# Patient Record
Sex: FEMALE | Race: WHITE | Employment: OTHER | URBAN - METROPOLITAN AREA
[De-identification: names, ages, dates, MRNs, and addresses within clinical notes are randomized per-mention and may not be internally consistent; named-entity substitution may affect disease eponyms.]

---

## 2023-12-20 RX ORDER — TRAMADOL HYDROCHLORIDE 50 MG/1
50 TABLET ORAL EVERY 6 HOURS PRN
Status: CANCELLED | OUTPATIENT
Start: 2023-12-20

## 2023-12-20 NOTE — DISCHARGE INSTRUCTIONS
The patient will call for an appointment in the next 2 weeks for follow-up.    The patient has been instructed on wound care and may shower if wound is clean and dry.  Perform dry dressing change daily.  Do NOT remove Prineo mesh adhesive overlying surgical incision.  If the wound has drainage then dry dressing changes should be performed daily until the wound is dry.  The patient has been instructed to contact the office if increasing drainage, redness, or swelling to the operative wound/extremity occurs.  Similarly, if they develop fevers and chills they should call the office ASAP.    The patient will continue to wear DARIEL hose to both legs for 3 weeks.  They may remove them at night or if they are causing skin irritation.  Prescribed DVT prophylaxis should be taken for 2-3 weeks after discharge (as written on prescription).  Oral pain medications have been provided and instruction on administration given to the patient.  If there are any questions or increasing or uncontrolled pain, the patient should call the office [phone].    The patient will resume a normal diet.  They should anticipate a slight decrease in appetite after surgery, but should notify the office if there are any problems with nausea, vomiting, constipation or diarrhea.  A stool softner has been ordered and should be taken regularly while on pain medications.    Sometimes managing your health at home requires assistance.  The [facility] team was notified that your surgeon has preoperatively arranged for you to use [facility].  A representative from the home health agency will contact you or your family to schedule your first visit.      [facility]  [address] [address]  Phone: [phone]  Fax: [phone]

## 2023-12-28 RX ORDER — SERTRALINE HYDROCHLORIDE 25 MG/1
50 TABLET, FILM COATED ORAL DAILY
COMMUNITY
Start: 2023-12-08

## 2023-12-28 RX ORDER — BRIVARACETAM 100 MG/1
1 TABLET, FILM COATED ORAL 2 TIMES DAILY
COMMUNITY
Start: 2022-10-31

## 2023-12-28 RX ORDER — INSULIN GLARGINE 100 [IU]/ML
28 INJECTION, SOLUTION SUBCUTANEOUS EVERY MORNING
COMMUNITY
Start: 2023-12-14

## 2023-12-28 RX ORDER — ZOLPIDEM TARTRATE 10 MG/1
10 TABLET ORAL NIGHTLY PRN
COMMUNITY

## 2023-12-28 RX ORDER — IBANDRONATE SODIUM 150 MG/1
150 TABLET, FILM COATED ORAL
COMMUNITY

## 2023-12-28 RX ORDER — INSULIN LISPRO 100 [IU]/ML
10 INJECTION, SOLUTION INTRAVENOUS; SUBCUTANEOUS
COMMUNITY
Start: 2023-10-16

## 2023-12-28 RX ORDER — MINOXIDIL 2.5 MG/1
2.5 TABLET ORAL DAILY
COMMUNITY
Start: 2022-11-15

## 2023-12-28 RX ORDER — LOSARTAN POTASSIUM 25 MG/1
1 TABLET ORAL DAILY
COMMUNITY
Start: 2023-07-06

## 2023-12-28 RX ORDER — GLUCAGON INJECTION, SOLUTION 1 MG/.2ML
1 INJECTION, SOLUTION SUBCUTANEOUS ONCE AS NEEDED
COMMUNITY
Start: 2022-05-31

## 2023-12-28 NOTE — CM/SW NOTE
DSC received email (Umm) from Dr. No's office indicating pt is scheduled for an upcoming surgery.      DC notes indicate pt's discharge plan should be home with HHC     Pre Op Plan for DC: Home with HHC - agency pending    DSC will send HHC referrals, will need HHC orders/F2F prior to DC  Saba Mayorga DSC

## 2024-01-03 ENCOUNTER — APPOINTMENT (OUTPATIENT)
Dept: GENERAL RADIOLOGY | Facility: HOSPITAL | Age: 74
End: 2024-01-03
Attending: NURSE PRACTITIONER
Payer: MEDICARE

## 2024-01-03 ENCOUNTER — ANESTHESIA (OUTPATIENT)
Dept: SURGERY | Facility: HOSPITAL | Age: 74
End: 2024-01-03
Payer: MEDICARE

## 2024-01-03 ENCOUNTER — HOSPITAL ENCOUNTER (OUTPATIENT)
Facility: HOSPITAL | Age: 74
Discharge: HOME HEALTH | End: 2024-01-05
Attending: ORTHOPAEDIC SURGERY | Admitting: ORTHOPAEDIC SURGERY
Payer: MEDICARE

## 2024-01-03 ENCOUNTER — ANESTHESIA EVENT (OUTPATIENT)
Dept: SURGERY | Facility: HOSPITAL | Age: 74
End: 2024-01-03
Payer: MEDICARE

## 2024-01-03 PROBLEM — I10 ESSENTIAL HYPERTENSION: Status: ACTIVE | Noted: 2024-01-03

## 2024-01-03 PROBLEM — G40.909 SEIZURE DISORDER (HCC): Status: ACTIVE | Noted: 2024-01-03

## 2024-01-03 PROBLEM — M16.12 PRIMARY OSTEOARTHRITIS OF LEFT HIP: Status: ACTIVE | Noted: 2024-01-03

## 2024-01-03 PROBLEM — E10.9 DIABETES MELLITUS TYPE 1 (HCC): Status: ACTIVE | Noted: 2024-01-03

## 2024-01-03 PROBLEM — N18.30 CKD (CHRONIC KIDNEY DISEASE) STAGE 3, GFR 30-59 ML/MIN (HCC): Status: ACTIVE | Noted: 2024-01-03

## 2024-01-03 LAB
ANION GAP SERPL CALC-SCNC: 4 MMOL/L (ref 0–18)
ANTIBODY SCREEN: NEGATIVE
BUN BLD-MCNC: 22 MG/DL (ref 9–23)
BUN/CREAT SERPL: 19 (ref 10–20)
CALCIUM BLD-MCNC: 8.5 MG/DL (ref 8.7–10.4)
CHLORIDE SERPL-SCNC: 111 MMOL/L (ref 98–112)
CO2 SERPL-SCNC: 24 MMOL/L (ref 21–32)
CREAT BLD-MCNC: 1.16 MG/DL
EGFRCR SERPLBLD CKD-EPI 2021: 50 ML/MIN/1.73M2 (ref 60–?)
EST. AVERAGE GLUCOSE BLD GHB EST-MCNC: 143 MG/DL (ref 68–126)
GLUCOSE BLD-MCNC: 284 MG/DL (ref 70–99)
GLUCOSE BLDC GLUCOMTR-MCNC: 231 MG/DL (ref 70–99)
GLUCOSE BLDC GLUCOMTR-MCNC: 306 MG/DL (ref 70–99)
GLUCOSE BLDC GLUCOMTR-MCNC: 306 MG/DL (ref 70–99)
GLUCOSE BLDC GLUCOMTR-MCNC: 314 MG/DL (ref 70–99)
GLUCOSE BLDC GLUCOMTR-MCNC: 337 MG/DL (ref 70–99)
GLUCOSE BLDC GLUCOMTR-MCNC: 369 MG/DL (ref 70–99)
GLUCOSE BLDC GLUCOMTR-MCNC: 377 MG/DL (ref 70–99)
GLUCOSE BLDC GLUCOMTR-MCNC: 390 MG/DL (ref 70–99)
HBA1C MFR BLD: 6.6 % (ref ?–5.7)
OSMOLALITY SERPL CALC.SUM OF ELEC: 302 MOSM/KG (ref 275–295)
POTASSIUM SERPL-SCNC: 4.1 MMOL/L (ref 3.5–5.1)
RH BLOOD TYPE: POSITIVE
RH BLOOD TYPE: POSITIVE
SODIUM SERPL-SCNC: 139 MMOL/L (ref 136–145)

## 2024-01-03 PROCEDURE — 0SRB0JZ REPLACEMENT OF LEFT HIP JOINT WITH SYNTHETIC SUBSTITUTE, OPEN APPROACH: ICD-10-PCS | Performed by: ORTHOPAEDIC SURGERY

## 2024-01-03 PROCEDURE — 99205 OFFICE O/P NEW HI 60 MIN: CPT | Performed by: HOSPITALIST

## 2024-01-03 PROCEDURE — 72170 X-RAY EXAM OF PELVIS: CPT | Performed by: NURSE PRACTITIONER

## 2024-01-03 DEVICE — IMPLANTABLE DEVICE
Type: IMPLANTABLE DEVICE | Site: HIP | Status: FUNCTIONAL
Brand: G7® ACETABULAR SYSTEM

## 2024-01-03 DEVICE — SCREW BNE L30MM DIA6.5MM TIV ST COUNTERSUNK: Type: IMPLANTABLE DEVICE | Site: HIP | Status: FUNCTIONAL

## 2024-01-03 DEVICE — IMPLANTABLE DEVICE
Type: IMPLANTABLE DEVICE | Site: HIP | Status: FUNCTIONAL
Brand: G7® VIVACIT-E®

## 2024-01-03 DEVICE — IMPLANTABLE DEVICE
Type: IMPLANTABLE DEVICE | Site: HIP | Status: FUNCTIONAL
Brand: WAGNER CONE PROSTHESIS®

## 2024-01-03 DEVICE — IMPLANTABLE DEVICE: Type: IMPLANTABLE DEVICE | Site: HIP | Status: FUNCTIONAL

## 2024-01-03 DEVICE — BIOLOX® DELTA, CERAMIC FEMORAL HEAD, M, Ø 36/0, TAPER 12/14
Type: IMPLANTABLE DEVICE | Site: HIP | Status: FUNCTIONAL
Brand: BIOLOX® DELTA

## 2024-01-03 RX ORDER — NEOSTIGMINE METHYLSULFATE 1 MG/ML
INJECTION, SOLUTION INTRAVENOUS AS NEEDED
Status: DISCONTINUED | OUTPATIENT
Start: 2024-01-03 | End: 2024-01-03 | Stop reason: SURG

## 2024-01-03 RX ORDER — DEXTROSE MONOHYDRATE 25 G/50ML
50 INJECTION, SOLUTION INTRAVENOUS
Status: DISCONTINUED | OUTPATIENT
Start: 2024-01-03 | End: 2024-01-05

## 2024-01-03 RX ORDER — INSULIN ASPART 100 [IU]/ML
15 INJECTION, SOLUTION INTRAVENOUS; SUBCUTANEOUS ONCE
Status: COMPLETED | OUTPATIENT
Start: 2024-01-03 | End: 2024-01-03

## 2024-01-03 RX ORDER — MIDAZOLAM HYDROCHLORIDE 1 MG/ML
INJECTION INTRAMUSCULAR; INTRAVENOUS AS NEEDED
Status: DISCONTINUED | OUTPATIENT
Start: 2024-01-03 | End: 2024-01-03 | Stop reason: SURG

## 2024-01-03 RX ORDER — EPHEDRINE SULFATE 50 MG/ML
INJECTION INTRAVENOUS AS NEEDED
Status: DISCONTINUED | OUTPATIENT
Start: 2024-01-03 | End: 2024-01-03 | Stop reason: SURG

## 2024-01-03 RX ORDER — BISACODYL 10 MG
10 SUPPOSITORY, RECTAL RECTAL
Status: DISCONTINUED | OUTPATIENT
Start: 2024-01-03 | End: 2024-01-05

## 2024-01-03 RX ORDER — NICOTINE POLACRILEX 4 MG
30 LOZENGE BUCCAL
Status: DISCONTINUED | OUTPATIENT
Start: 2024-01-03 | End: 2024-01-05

## 2024-01-03 RX ORDER — DIPHENHYDRAMINE HYDROCHLORIDE 50 MG/ML
25 INJECTION INTRAMUSCULAR; INTRAVENOUS ONCE AS NEEDED
Status: ACTIVE | OUTPATIENT
Start: 2024-01-03 | End: 2024-01-03

## 2024-01-03 RX ORDER — MORPHINE SULFATE 4 MG/ML
4 INJECTION, SOLUTION INTRAMUSCULAR; INTRAVENOUS EVERY 10 MIN PRN
Status: DISCONTINUED | OUTPATIENT
Start: 2024-01-03 | End: 2024-01-03 | Stop reason: HOSPADM

## 2024-01-03 RX ORDER — LIDOCAINE HYDROCHLORIDE 10 MG/ML
INJECTION, SOLUTION EPIDURAL; INFILTRATION; INTRACAUDAL; PERINEURAL AS NEEDED
Status: DISCONTINUED | OUTPATIENT
Start: 2024-01-03 | End: 2024-01-03 | Stop reason: SURG

## 2024-01-03 RX ORDER — NALOXONE HYDROCHLORIDE 0.4 MG/ML
80 INJECTION, SOLUTION INTRAMUSCULAR; INTRAVENOUS; SUBCUTANEOUS AS NEEDED
Status: DISCONTINUED | OUTPATIENT
Start: 2024-01-03 | End: 2024-01-03 | Stop reason: HOSPADM

## 2024-01-03 RX ORDER — DIPHENHYDRAMINE HYDROCHLORIDE 50 MG/ML
12.5 INJECTION INTRAMUSCULAR; INTRAVENOUS EVERY 4 HOURS PRN
Status: DISCONTINUED | OUTPATIENT
Start: 2024-01-03 | End: 2024-01-05

## 2024-01-03 RX ORDER — DOCUSATE SODIUM 100 MG/1
100 CAPSULE, LIQUID FILLED ORAL 2 TIMES DAILY
Status: DISCONTINUED | OUTPATIENT
Start: 2024-01-03 | End: 2024-01-05

## 2024-01-03 RX ORDER — HYDROCODONE BITARTRATE AND ACETAMINOPHEN 5; 325 MG/1; MG/1
1 TABLET ORAL EVERY 4 HOURS PRN
Status: DISCONTINUED | OUTPATIENT
Start: 2024-01-03 | End: 2024-01-05

## 2024-01-03 RX ORDER — DIPHENHYDRAMINE HCL 25 MG
25 CAPSULE ORAL EVERY 4 HOURS PRN
Status: DISCONTINUED | OUTPATIENT
Start: 2024-01-03 | End: 2024-01-05

## 2024-01-03 RX ORDER — KETOROLAC TROMETHAMINE 15 MG/ML
15 INJECTION, SOLUTION INTRAMUSCULAR; INTRAVENOUS ONCE
Status: COMPLETED | OUTPATIENT
Start: 2024-01-03 | End: 2024-01-03

## 2024-01-03 RX ORDER — CEFAZOLIN SODIUM/WATER 2 G/20 ML
2 SYRINGE (ML) INTRAVENOUS ONCE
Status: COMPLETED | OUTPATIENT
Start: 2024-01-03 | End: 2024-01-03

## 2024-01-03 RX ORDER — DEXTROSE MONOHYDRATE 25 G/50ML
50 INJECTION, SOLUTION INTRAVENOUS
Status: DISCONTINUED | OUTPATIENT
Start: 2024-01-03 | End: 2024-01-03 | Stop reason: HOSPADM

## 2024-01-03 RX ORDER — FAMOTIDINE 20 MG/1
20 TABLET, FILM COATED ORAL 2 TIMES DAILY
Status: DISCONTINUED | OUTPATIENT
Start: 2024-01-03 | End: 2024-01-05

## 2024-01-03 RX ORDER — ROCURONIUM BROMIDE 10 MG/ML
INJECTION, SOLUTION INTRAVENOUS AS NEEDED
Status: DISCONTINUED | OUTPATIENT
Start: 2024-01-03 | End: 2024-01-03 | Stop reason: SURG

## 2024-01-03 RX ORDER — DEXAMETHASONE SODIUM PHOSPHATE 4 MG/ML
VIAL (ML) INJECTION AS NEEDED
Status: DISCONTINUED | OUTPATIENT
Start: 2024-01-03 | End: 2024-01-03 | Stop reason: SURG

## 2024-01-03 RX ORDER — ONDANSETRON 2 MG/ML
4 INJECTION INTRAMUSCULAR; INTRAVENOUS EVERY 6 HOURS PRN
Status: DISCONTINUED | OUTPATIENT
Start: 2024-01-03 | End: 2024-01-03 | Stop reason: HOSPADM

## 2024-01-03 RX ORDER — SODIUM CHLORIDE, SODIUM LACTATE, POTASSIUM CHLORIDE, CALCIUM CHLORIDE 600; 310; 30; 20 MG/100ML; MG/100ML; MG/100ML; MG/100ML
INJECTION, SOLUTION INTRAVENOUS CONTINUOUS
Status: DISCONTINUED | OUTPATIENT
Start: 2024-01-03 | End: 2024-01-05

## 2024-01-03 RX ORDER — HYDROMORPHONE HYDROCHLORIDE 1 MG/ML
0.4 INJECTION, SOLUTION INTRAMUSCULAR; INTRAVENOUS; SUBCUTANEOUS EVERY 5 MIN PRN
Status: DISCONTINUED | OUTPATIENT
Start: 2024-01-03 | End: 2024-01-03 | Stop reason: HOSPADM

## 2024-01-03 RX ORDER — HYDROMORPHONE HYDROCHLORIDE 1 MG/ML
0.6 INJECTION, SOLUTION INTRAMUSCULAR; INTRAVENOUS; SUBCUTANEOUS EVERY 5 MIN PRN
Status: DISCONTINUED | OUTPATIENT
Start: 2024-01-03 | End: 2024-01-03 | Stop reason: HOSPADM

## 2024-01-03 RX ORDER — CYCLOBENZAPRINE HCL 10 MG
10 TABLET ORAL EVERY 8 HOURS PRN
Status: DISCONTINUED | OUTPATIENT
Start: 2024-01-03 | End: 2024-01-05

## 2024-01-03 RX ORDER — HYDROCODONE BITARTRATE AND ACETAMINOPHEN 10; 325 MG/1; MG/1
1 TABLET ORAL EVERY 4 HOURS PRN
Status: DISCONTINUED | OUTPATIENT
Start: 2024-01-03 | End: 2024-01-05

## 2024-01-03 RX ORDER — METOCLOPRAMIDE HYDROCHLORIDE 5 MG/ML
10 INJECTION INTRAMUSCULAR; INTRAVENOUS EVERY 8 HOURS PRN
Status: DISCONTINUED | OUTPATIENT
Start: 2024-01-03 | End: 2024-01-05

## 2024-01-03 RX ORDER — ONDANSETRON 2 MG/ML
INJECTION INTRAMUSCULAR; INTRAVENOUS AS NEEDED
Status: DISCONTINUED | OUTPATIENT
Start: 2024-01-03 | End: 2024-01-03 | Stop reason: SURG

## 2024-01-03 RX ORDER — HYDROMORPHONE HYDROCHLORIDE 1 MG/ML
0.5 INJECTION, SOLUTION INTRAMUSCULAR; INTRAVENOUS; SUBCUTANEOUS EVERY 4 HOURS PRN
Status: DISCONTINUED | OUTPATIENT
Start: 2024-01-03 | End: 2024-01-05

## 2024-01-03 RX ORDER — MORPHINE SULFATE 10 MG/ML
6 INJECTION, SOLUTION INTRAMUSCULAR; INTRAVENOUS EVERY 10 MIN PRN
Status: DISCONTINUED | OUTPATIENT
Start: 2024-01-03 | End: 2024-01-03 | Stop reason: HOSPADM

## 2024-01-03 RX ORDER — ONDANSETRON 2 MG/ML
4 INJECTION INTRAMUSCULAR; INTRAVENOUS EVERY 6 HOURS PRN
Status: DISCONTINUED | OUTPATIENT
Start: 2024-01-03 | End: 2024-01-05

## 2024-01-03 RX ORDER — ZOLPIDEM TARTRATE 5 MG/1
10 TABLET ORAL NIGHTLY PRN
Status: DISCONTINUED | OUTPATIENT
Start: 2024-01-03 | End: 2024-01-05

## 2024-01-03 RX ORDER — CEFAZOLIN SODIUM/WATER 2 G/20 ML
2 SYRINGE (ML) INTRAVENOUS EVERY 8 HOURS
Qty: 40 ML | Refills: 0 | Status: COMPLETED | OUTPATIENT
Start: 2024-01-03 | End: 2024-01-04

## 2024-01-03 RX ORDER — HYDROCODONE BITARTRATE AND ACETAMINOPHEN 10; 325 MG/1; MG/1
2 TABLET ORAL EVERY 4 HOURS PRN
Status: DISCONTINUED | OUTPATIENT
Start: 2024-01-03 | End: 2024-01-05

## 2024-01-03 RX ORDER — ENEMA 19; 7 G/133ML; G/133ML
1 ENEMA RECTAL ONCE AS NEEDED
Status: DISCONTINUED | OUTPATIENT
Start: 2024-01-03 | End: 2024-01-05

## 2024-01-03 RX ORDER — FAMOTIDINE 10 MG/ML
20 INJECTION, SOLUTION INTRAVENOUS 2 TIMES DAILY
Status: DISCONTINUED | OUTPATIENT
Start: 2024-01-03 | End: 2024-01-05

## 2024-01-03 RX ORDER — ACETAMINOPHEN 500 MG
1000 TABLET ORAL ONCE
Status: COMPLETED | OUTPATIENT
Start: 2024-01-03 | End: 2024-01-03

## 2024-01-03 RX ORDER — BUPIVACAINE HYDROCHLORIDE 2.5 MG/ML
INJECTION, SOLUTION EPIDURAL; INFILTRATION; INTRACAUDAL AS NEEDED
Status: DISCONTINUED | OUTPATIENT
Start: 2024-01-03 | End: 2024-01-03 | Stop reason: HOSPADM

## 2024-01-03 RX ORDER — NICOTINE POLACRILEX 4 MG
30 LOZENGE BUCCAL
Status: DISCONTINUED | OUTPATIENT
Start: 2024-01-03 | End: 2024-01-03 | Stop reason: HOSPADM

## 2024-01-03 RX ORDER — NICOTINE POLACRILEX 4 MG
15 LOZENGE BUCCAL
Status: DISCONTINUED | OUTPATIENT
Start: 2024-01-03 | End: 2024-01-05

## 2024-01-03 RX ORDER — ASPIRIN 81 MG/1
81 TABLET ORAL 2 TIMES DAILY
Status: DISCONTINUED | OUTPATIENT
Start: 2024-01-03 | End: 2024-01-05

## 2024-01-03 RX ORDER — LOSARTAN POTASSIUM 25 MG/1
25 TABLET ORAL DAILY
Status: DISCONTINUED | OUTPATIENT
Start: 2024-01-04 | End: 2024-01-05

## 2024-01-03 RX ORDER — HYDROMORPHONE HYDROCHLORIDE 1 MG/ML
0.2 INJECTION, SOLUTION INTRAMUSCULAR; INTRAVENOUS; SUBCUTANEOUS EVERY 5 MIN PRN
Status: DISCONTINUED | OUTPATIENT
Start: 2024-01-03 | End: 2024-01-03 | Stop reason: HOSPADM

## 2024-01-03 RX ORDER — SENNOSIDES 8.6 MG
17.2 TABLET ORAL NIGHTLY
Status: DISCONTINUED | OUTPATIENT
Start: 2024-01-03 | End: 2024-01-05

## 2024-01-03 RX ORDER — TRANEXAMIC ACID 650 MG/1
1300 TABLET ORAL ONCE
Status: COMPLETED | OUTPATIENT
Start: 2024-01-03 | End: 2024-01-03

## 2024-01-03 RX ORDER — MINOXIDIL 2.5 MG/1
1.25 TABLET ORAL DAILY
Status: DISCONTINUED | OUTPATIENT
Start: 2024-01-04 | End: 2024-01-05

## 2024-01-03 RX ORDER — DIPHENHYDRAMINE HYDROCHLORIDE 50 MG/ML
INJECTION INTRAMUSCULAR; INTRAVENOUS AS NEEDED
Status: DISCONTINUED | OUTPATIENT
Start: 2024-01-03 | End: 2024-01-03 | Stop reason: SURG

## 2024-01-03 RX ORDER — SODIUM CHLORIDE, SODIUM LACTATE, POTASSIUM CHLORIDE, CALCIUM CHLORIDE 600; 310; 30; 20 MG/100ML; MG/100ML; MG/100ML; MG/100ML
INJECTION, SOLUTION INTRAVENOUS CONTINUOUS
Status: DISCONTINUED | OUTPATIENT
Start: 2024-01-03 | End: 2024-01-03 | Stop reason: HOSPADM

## 2024-01-03 RX ORDER — METOCLOPRAMIDE HYDROCHLORIDE 5 MG/ML
10 INJECTION INTRAMUSCULAR; INTRAVENOUS EVERY 8 HOURS PRN
Status: DISCONTINUED | OUTPATIENT
Start: 2024-01-03 | End: 2024-01-03 | Stop reason: HOSPADM

## 2024-01-03 RX ORDER — ACETAMINOPHEN 500 MG
1000 TABLET ORAL EVERY 8 HOURS PRN
Status: DISCONTINUED | OUTPATIENT
Start: 2024-01-03 | End: 2024-01-05

## 2024-01-03 RX ORDER — NICOTINE POLACRILEX 4 MG
15 LOZENGE BUCCAL
Status: DISCONTINUED | OUTPATIENT
Start: 2024-01-03 | End: 2024-01-03 | Stop reason: HOSPADM

## 2024-01-03 RX ORDER — MORPHINE SULFATE 4 MG/ML
2 INJECTION, SOLUTION INTRAMUSCULAR; INTRAVENOUS EVERY 10 MIN PRN
Status: DISCONTINUED | OUTPATIENT
Start: 2024-01-03 | End: 2024-01-03 | Stop reason: HOSPADM

## 2024-01-03 RX ORDER — POLYETHYLENE GLYCOL 3350 17 G/17G
17 POWDER, FOR SOLUTION ORAL DAILY PRN
Status: DISCONTINUED | OUTPATIENT
Start: 2024-01-03 | End: 2024-01-05

## 2024-01-03 RX ADMIN — EPHEDRINE SULFATE 5 MG: 50 INJECTION INTRAVENOUS at 11:14:00

## 2024-01-03 RX ADMIN — LIDOCAINE HYDROCHLORIDE 50 MG: 10 INJECTION, SOLUTION EPIDURAL; INFILTRATION; INTRACAUDAL; PERINEURAL at 09:52:00

## 2024-01-03 RX ADMIN — MIDAZOLAM HYDROCHLORIDE 1 MG: 1 INJECTION INTRAMUSCULAR; INTRAVENOUS at 09:43:00

## 2024-01-03 RX ADMIN — ROCURONIUM BROMIDE 40 MG: 10 INJECTION, SOLUTION INTRAVENOUS at 09:53:00

## 2024-01-03 RX ADMIN — DIPHENHYDRAMINE HYDROCHLORIDE 25 MG: 50 INJECTION INTRAMUSCULAR; INTRAVENOUS at 10:01:00

## 2024-01-03 RX ADMIN — SODIUM CHLORIDE, SODIUM LACTATE, POTASSIUM CHLORIDE, CALCIUM CHLORIDE: 600; 310; 30; 20 INJECTION, SOLUTION INTRAVENOUS at 11:37:00

## 2024-01-03 RX ADMIN — CEFAZOLIN SODIUM/WATER 2 G: 2 G/20 ML SYRINGE (ML) INTRAVENOUS at 10:01:00

## 2024-01-03 RX ADMIN — SODIUM CHLORIDE, SODIUM LACTATE, POTASSIUM CHLORIDE, CALCIUM CHLORIDE: 600; 310; 30; 20 INJECTION, SOLUTION INTRAVENOUS at 09:39:00

## 2024-01-03 RX ADMIN — EPHEDRINE SULFATE 5 MG: 50 INJECTION INTRAVENOUS at 11:09:00

## 2024-01-03 RX ADMIN — ONDANSETRON 4 MG: 2 INJECTION INTRAMUSCULAR; INTRAVENOUS at 11:28:00

## 2024-01-03 RX ADMIN — SODIUM CHLORIDE, SODIUM LACTATE, POTASSIUM CHLORIDE, CALCIUM CHLORIDE: 600; 310; 30; 20 INJECTION, SOLUTION INTRAVENOUS at 10:51:00

## 2024-01-03 RX ADMIN — EPHEDRINE SULFATE 5 MG: 50 INJECTION INTRAVENOUS at 10:52:00

## 2024-01-03 RX ADMIN — DEXAMETHASONE SODIUM PHOSPHATE 4 MG: 4 MG/ML VIAL (ML) INJECTION at 10:01:00

## 2024-01-03 RX ADMIN — MIDAZOLAM HYDROCHLORIDE 1 MG: 1 INJECTION INTRAMUSCULAR; INTRAVENOUS at 09:46:00

## 2024-01-03 RX ADMIN — EPHEDRINE SULFATE 5 MG: 50 INJECTION INTRAVENOUS at 11:21:00

## 2024-01-03 RX ADMIN — SODIUM CHLORIDE, SODIUM LACTATE, POTASSIUM CHLORIDE, CALCIUM CHLORIDE: 600; 310; 30; 20 INJECTION, SOLUTION INTRAVENOUS at 11:11:00

## 2024-01-03 RX ADMIN — NEOSTIGMINE METHYLSULFATE 3 MG: 1 INJECTION, SOLUTION INTRAVENOUS at 11:32:00

## 2024-01-03 NOTE — CM/SW NOTE
Department  notified of request for HHC, aidin referrals started. Assigned CM/SW to follow up with pt/family on further discharge planning.     Saba Mayorga, DSC

## 2024-01-03 NOTE — ANESTHESIA PREPROCEDURE EVALUATION
Anesthesia PreOp Note    HPI:     Nikia Urrutia is a 73 year old female who presents for preoperative consultation requested by: Williams No MD    Date of Surgery: 1/3/2024    Procedure(s):  Left total hip arthroplasty  Indication: Left hip degenerative joint disease    Relevant Problems   No relevant active problems       NPO:  Last Liquid Consumption Date: 24  Last Liquid Consumption Time: 2200  Last Solid Consumption Date: 24  Last Solid Consumption Time: 1900  Last Liquid Consumption Date: 24          History Review:  There are no problems to display for this patient.      Past Medical History:   Diagnosis Date    Back problem     Cancer (HCC)     right breast    Cancer (HCC)     basal skin on face    Depression     Exposure to medical diagnostic radiation     Hearing impairment     [employer] hearing aides    High blood pressure     Hx of motion sickness     Osteoarthritis     Personal history of antineoplastic chemotherapy     Seizure disorder (HCC)     Type 1 diabetes mellitus (HCC)     Visual impairment     contacts       Past Surgical History:   Procedure Laterality Date          x2    CHOLECYSTECTOMY      FRACTURE SURGERY Left     left hand and middle finger    LUMPECTOMY RIGHT      SPINE SURGERY PROCEDURE UNLISTED Right     S1 fusion    SPINE SURGERY PROCEDURE UNLISTED      L4- S1 fusion       Medications Prior to Admission   Medication Sig Dispense Refill Last Dose    Brivaracetam (BRIVIACT) 100 MG Oral Tab Take 1 tablet by mouth 2 (two) times daily.   1/3/2024 at 0530    Ibandronate Sodium 150 MG Oral Tab Take 1 tablet (150 mg total) by mouth every 30 (thirty) days.   2023    insulin glargine (LANTUS SOLOSTAR) 100 UNIT/ML Subcutaneous Solution Pen-injector Inject 28 Units into the skin every morning.   1/3/2024 at 0530    Insulin Lispro, 1 Unit Dial, (HUMALOG KWIKPEN) 100 UNIT/ML Subcutaneous Solution Pen-injector Inject 10 Units into the skin 3 (three) times daily  before meals.   2024 at 1830    losartan 25 MG Oral Tab Take 1 tablet (25 mg total) by mouth daily.   2024 at 0800    minoxidil 2.5 MG Oral Tab Take 1 tablet (2.5 mg total) by mouth daily. Take 1/2 tab daily   2024 at 0800    Multiple Vitamin (MULTI-VITAMIN) Oral Tab Take 1 tablet by mouth daily.   2023    sertraline 25 MG Oral Tab Take 2 tablets (50 mg total) by mouth daily.   1/3/2024 at 0530    zolpidem 10 MG Oral Tab Take 1 tablet (10 mg total) by mouth nightly as needed.   2024 at 2100    glucagon (GVOKE HYPOPEN 2-PACK) 1 MG/0.2ML Subcutaneous SUBQ injection Inject 0.2 mL (1 mg total) into the skin once as needed for Low blood glucose.   More than a month     Current Facility-Administered Medications Ordered in Epic   Medication Dose Route Frequency Provider Last Rate Last Admin    ceFAZolin (Ancef) 2 g in 20mL IV syringe premix  2 g Intravenous Once Sammi Rutherford NP        lactated ringers infusion   Intravenous Continuous NoWilliams de la cruz MD 20 mL/hr at 24 0814 New Bag at 24 0814     No current [facility]-ordered outpatient medications on file.       Allergies   Allergen Reactions    Penicillins TONGUE SWELLING    Sulfa Antibiotics TONGUE SWELLING       Family History   Problem Relation Age of Onset    Cancer Mother         ovarian and breast     Social History     Socioeconomic History    Marital status:    Tobacco Use    Smoking status: Former     Types: Cigarettes     Quit date: 1975     Years since quittin.0    Smokeless tobacco: Never   Vaping Use    Vaping Use: Never used   Substance and Sexual Activity    Alcohol use: Yes     Comment: social-  5 drinks per week    Drug use: Never       Available pre-op labs reviewed.     Lab Results   Component Value Date    PGLU 369 (H) 2024          Vital Signs:  Body mass index is 23.3 kg/m².   height is 1.651 m (5' 5\") and weight is 63.5 kg (140 lb). Her oral temperature is 98.4 °F (36.9 °C). Her blood pressure is  131/67 and her pulse is 87. Her respiration is 18 and oxygen saturation is 98%.   Vitals:    12/28/23 0831 01/03/24 0750   BP:  131/67   Pulse:  87   Resp:  18   Temp:  98.4 °F (36.9 °C)   TempSrc:  Oral   SpO2:  98%   Weight: 65.8 kg (145 lb) 63.5 kg (140 lb)   Height: 1.651 m (5' 5\") 1.651 m (5' 5\")        Anesthesia Evaluation      Airway   Mallampati: II  TM distance: >3 FB  Neck ROM: full  Dental - Dentition appears grossly intact     Pulmonary - normal exam   (-) asthma, sleep apnea  Cardiovascular - normal exam  (+) hypertension  (-) past MI, dysrhythmias    ECG reviewed    Neuro/Psych    (+)  seizures,      (-) CVA    GI/Hepatic/Renal    (-) GERD    Endo/Other    (+) diabetes mellitus poorly controlled, arthritis  Abdominal                  Anesthesia Plan:   ASA:  3  Plan:   Spinal  Post-op Pain Management: IV analgesics, Oral pain medication and Intrathecal narcotics  Informed Consent Plan and Risks Discussed With:  Patient  Discussed plan with:  CRNA      I have informed Nikia Urrutia and/or legal guardian or family member of the nature of the anesthetic plan, benefits, risks including possible dental damage if relevant, major complications, and any alternative forms of anesthetic management.   All of the patient's questions were answered to the best of my ability. The patient desires the anesthetic management as planned.  VENU JETER MD  1/3/2024 9:27 AM  Present on Admission:  **None**

## 2024-01-03 NOTE — ANESTHESIA PROCEDURE NOTES
Airway  Date/Time: 1/3/2024 9:57 AM  Urgency: Elective    Airway not difficult    General Information and Staff    Patient location during procedure: OR  Anesthesiologist: Marybeth Alvarado MD  Resident/CRNA: Lorena Caal CRNA  Performed: CRNA   Performed by: Lorena Caal CRNA  Authorized by: Marybeth Alvarado MD      Indications and Patient Condition  Indications for airway management: anesthesia  Spontaneous Ventilation: absent  Sedation level: deep  Preoxygenated: yes  Patient position: sniffing  MILS maintained throughout  Mask difficulty assessment: 1 - vent by mask    Final Airway Details  Final airway type: endotracheal airway      Successful airway: ETT  Cuffed: yes   Successful intubation technique: direct laryngoscopy  Facilitating devices/methods: cricoid pressure, intubating stylet and tooth guard  Endotracheal tube insertion site: oral  Blade: Debbie  Blade size: #3  ETT size (mm): 7.0    Cormack-Lehane Classification: grade I - full view of glottis  Placement verified by: capnometry   Cuff volume (mL): 9  Measured from: teeth  ETT to teeth (cm): 21  Number of attempts at approach: 1

## 2024-01-03 NOTE — OPERATIVE REPORT
[facility]    IESHA Brief Operative Note    Nikia Urrutia Patient Status:  Outpatient in a Bed    3/17/1950 MRN [id number]   Location [facility] PRE OP RECOVERY Attending Ovidio No MD     PCP MUKUL FALK MD       Preop DX: Left hip degenerative joint disease   Postop DX: left hip degenerative joint disease  Procedure:  left total hip arthroplasty  Surgeon: Ovidio No MD  Assistants:  CHATO Rutherford; SA Kyara; MD Eris  Anesthesia: Spinal Anesthesia  EBL: 300 mL  Fluids: 800 mL  Findings: DJD  left hip  Drain: None  Specimens: Bone left hip  Implants: G7, Dahl Cone   Complications: none  All needle and sponge counts correct prior to leaving the operating room.  All assistants were a medical necessity to complete this procedure.  Plan: Transfer to recovery room in stable condition.  Transition to floor when stable.       OVIDIO NO MD  [phone] (c)  [phone] (o)  1/3/2024

## 2024-01-03 NOTE — PHYSICAL THERAPY NOTE
PHYSICAL THERAPY HIP EVALUATION - INPATIENT     Room Number: Room 3/Room 3-A  Evaluation Date: 1/3/2024  Type of Evaluation: Initial  Physician Order: PT Eval and Treat    Presenting Problem: s/p L IESHA on 1/3     Reason for Therapy: Mobility Dysfunction and Discharge Planning    PHYSICAL THERAPY ASSESSMENT     Patient is a 73 year old female admitted 1/3/2024 for L IESHA.  Patient's current functional deficits include pain, weakness, impaired balance and functional mobility, which are below the patient's pre-admission status.  Patient will benefit from continued IP PT services to address these deficits in preparation for discharge.      RN approved participation with physical therapy. Pt was received resting in bed and agreeable to activity. Daughter at bedside. Educated on WBAT, IESHA mobility protocol and exercises, role of PT and PT plan of care. Pt was educated regarding not having any hip precautions. Pt verbalized understanding.  Bed mobility: CGA supine>sit   Transfers: CGA for STS with RW; verbal cues given for safe hand placement and body mechanics prior to transfer. Good carryover noted. CGA toilet transfer.  Gait: 15 ft x 2 with RW and CGA, to bathroom then to chair. Slow pace, flexed posture, decreased weight bearing and stance on LLE, step-to pattern. No LOB.     Pt was left sitting in chair with needs within reach, handoff to RN complete.    The patient's Approx Degree of Impairment: 46.58% has been calculated based on documentation in the [facility] '6 clicks' Inpatient Basic Mobility Short Form.  Research supports that patients with this level of impairment may benefit from home with HH PT.    DISCHARGE RECOMMENDATIONS  PT Discharge Recommendations: 24 hour care/supervision;Home with home health PT    PLAN  PT Treatment Plan: Bed mobility;Body mechanics;Endurance;Energy conservation;Patient education;Gait training;Strengthening;Stair training;Transfer training;Balance training  Rehab Potential :  Good  Frequency (Obs): BID    PHYSICAL THERAPY MEDICAL/SOCIAL HISTORY     Problem List  Principal Problem:    Primary osteoarthritis of left hip  Active Problems:    Essential hypertension    Diabetes mellitus type 1 (HCC)    CKD (chronic kidney disease) stage 3, GFR 30-59 ml/min (HCC)    Seizure disorder (HCC)    HOME SITUATION  Home Situation  Type of Home: Condo  Home Layout: One level  Stairs to Enter : 4  Railing: Yes  Lives With: Spouse  Drives: Yes  Patient Owned Equipment: Rolling walker  Patient Regularly Uses: None     Prior Level of [place]: independent with ADLs and mobility without assistive device     SUBJECTIVE  Agreeable to activity.    PHYSICAL THERAPY EXAMINATION     OBJECTIVE  Precautions: None (per MD order: \"NO hip precautions (patient is part of a research study)\")  Fall Risk: Standard fall risk    WEIGHT BEARING RESTRICTION  Weight Bearing Restriction: L lower extremity  L Lower Extremity: Weight Bearing as Tolerated    PAIN ASSESSMENT  Rating:  (did not rate)  Location: left hip  Management Techniques: Activity promotion;Body mechanics;Repositioning    COGNITION  Overall Cognitive Status:  WFL - within functional limits    RANGE OF MOTION AND STRENGTH ASSESSMENT  Upper extremity ROM and strength are within functional limits.  Lower extremity ROM is within functional limits.  Lower extremity strength is within functional limits.    BALANCE  Static Sitting: Good  Dynamic Sitting: Fair +  Static Standing: Fair  Dynamic Standing: Fair -    AM-PAC '6-Clicks' INPATIENT SHORT FORM - BASIC MOBILITY  How much difficulty does the patient currently have...  Patient Difficulty: Turning over in bed (including adjusting bedclothes, sheets and blankets)?: A Little   Patient Difficulty: Sitting down on and standing up from a chair with arms (e.g., wheelchair, bedside commode, etc.): A Little   Patient Difficulty: Moving from lying on back to sitting on the side of the bed?: A Little   How much help from  another person does the patient currently need...   Help from Another: Moving to and from a bed to a chair (including a wheelchair)?: A Little   Help from Another: Need to walk in hospital room?: A Little   Help from Another: Climbing 3-5 steps with a railing?: A Little     AM-PAC Score:  Raw Score: 18   Approx Degree of Impairment: 46.58%   Standardized Score (AM-PAC Scale): 43.63   CMS Modifier (G-Code): CK    FUNCTIONAL ABILITY STATUS  Functional Mobility/Gait Assessment  Gait Assistance: Contact guard assist  Distance (ft): 15 x 2  Assistive Device: Rolling walker  Pattern: Shuffle;L Decreased stance time (flexed posture, slow pace)    Bed Mobility: CGA    Transfers: CGA    Exercise/Education Provided:  Bed mobility  Body mechanics  Energy conservation  Functional activity tolerated  Gait training  Posture  Lower therapeutic exercise:  Ankle pumps  Hip AB/AD  Heel slides  LAQ  Quad sets  Transfer training    Patient End of Session: Up in chair;Needs met;Call light within reach;RN aware of session/findings;All patient questions and concerns addressed;Ice applied;Family present    CURRENT GOALS    Goals to be met by: 1/10/24  Patient Goal Patient's self-stated goal is: go home   Goal #1 Patient is able to demonstrate supine - sit EOB @ level: modified independent   Goal #1   Current Status    Goal #2 Patient is able to demonstrate transfers Sit to/from Stand at assistance level: modified independent     Goal #2  Current Status    Goal #3 Patient is able to ambulate 150 feet with assistive device at assistance level: supervision   Goal #3   Current Status    Goal #4 Patient will negotiate 4 stairs/one curb w/ assistive device and supervision   Goal #4   Current Status    Goal #5 Patient verbalizes and/or demonstrates all precautions and safety concerns independently   Goal #5   Current Status    Goal #6 Patient independently performs home exercise program for ROM/strengthening per the instructions provided in  preparation for discharge.   Goal #6  Current Status      Patient Evaluation Complexity Level:  History Low - no personal factors and/or co-morbidities   Examination of body systems Low - addressing 1-2 elements   Clinical Presentation Low - Stable   Clinical Decision Making Low Complexity     Therapeutic Activity: 24 minutes

## 2024-01-03 NOTE — ANESTHESIA POSTPROCEDURE EVALUATION
Patient: Nikia Urrutia    Procedure Summary       Date: 01/03/24 Room / Location: [facility] MAIN OR 04 / [facility] MAIN OR    Anesthesia Start: 0939 Anesthesia Stop: 1145    Procedure: Left total hip arthroplasty (Left: Hip) Diagnosis: (Left hip degenerative joint disease)    Surgeons: Williams No MD Anesthesiologist: Marybeth Alvarado MD    Anesthesia Type: spinal ASA Status: 3            Anesthesia Type: spinal    Vitals Value Taken Time   /56 01/03/24 1144   Temp 97.4 °F (36.3 °C) 01/03/24 1144   Pulse 56 01/03/24 1144   Resp 17 01/03/24 1144   SpO2 100 % 01/03/24 1144   Vitals shown include unfiled device data.    [facility] AN Post Evaluation:   Patient Evaluated in PACU  Patient Participation: complete - patient participated  Level of Consciousness: awake and alert  Pain Score: 0  Pain Management: adequate  Airway Patency:patent  Dental exam unchanged from preop  Yes    Cardiovascular Status: hemodynamically stable  Respiratory Status: acceptable and nasal cannula  Postoperative Hydration acceptable    Lorena Caal CRNA  1/3/2024 11:45 AM

## 2024-01-03 NOTE — PROGRESS NOTES
PT blood sugar 390  PT took 14 units this morning @ 0530  Pt took 10 units last night 01/02/2024 @ 1830  Anesthesia informed, no new orders. Does not want to cover yet due to insulin taken this morning by pt.  Going to recheck in 1 hr.

## 2024-01-03 NOTE — CONSULTS
[facility]    PATIENT'S NAME: FELICIA VAUGHAN   ATTENDING PHYSICIAN: Williams No MD   CONSULTING PHYSICIAN: Ovidio Mendoza MD   PATIENT ACCOUNT#:   [id number]    LOCATION:  [facility] PACU 3 [facility] 10  MEDICAL RECORD #:   [id number]       YOB: 1950  ADMISSION DATE:       01/03/2024      CONSULT DATE:  01/03/2024    REPORT OF CONSULTATION      REASON FOR ADMISSION:  Left total hip arthroplasty.    REASON FOR CONSULTATION:  Medical comanagement.    HISTORY OF PRESENT ILLNESS:  The patient is a 73-year-old  female with chronic left hip pain with severe osteoarthritis.  Failed outpatient conservative medical management options.  Scheduled today for left total hip arthroplasty by Dr. Williams No.  Postoperatively transferred to PACU for further monitoring.    PAST MEDICAL HISTORY:  Insulin-dependent diabetes mellitus type 1, generalized osteoarthritis, seizure disorder, osteoporosis, left breast cancer, essential hypertension, chronic kidney disease stage 3.    PAST SURGICAL HISTORY:  Right breast lumpectomy followed by radiation and chemotherapy for breast cancer, 2 C-sections, nasal septoplasty, nose basal cell carcinoma resection, bilateral oophorectomy, cholecystectomy, lumbar laminectomy and fusion, sacral iliac joint fusion.    MEDICATIONS:  Please see medication reconciliation list.  She uses long-acting Levemir insulin 28 units in the morning and 10 units short-acting with meals plus sliding scale insulin.    ALLERGIES:  Penicillin and sulfa.    SOCIAL HISTORY:  Ex-tobacco user.  Social alcohol.  No drug use.  Lives with her family.  Independent in her basic activities of daily living.    FAMILY HISTORY:  Mother had ovarian and breast cancer.    REVIEW OF SYSTEMS:  Currently resting in bed.  Left hip pain.  No chest pain, no shortness of breath.  Other 12-point review of systems negative.      PHYSICAL EXAMINATION:    GENERAL:  Alert.  Oriented to time, place, and person.   Moderate distress.  VITAL SIGNS:  Temperature 97.4, pulse 55, respiratory rate 16, blood pressure 138/55, pulse ox 100% on room air.  HEENT:  Atraumatic.  Oropharynx clear, moist mucous membranes.  Normal hard and soft palate.  Eyes:  Anicteric sclerae.  NECK:  Supple.  No lymphadenopathy.  Trachea midline.  Full range of motion.  LUNGS:  Clear to auscultation bilaterally.  Normal respiratory effort.  HEART:  Regular rate and rhythm.  S1, S2 auscultated.  No murmur.  ABDOMEN:  Soft, nondistended, no tenderness.  Positive bowel sounds.  EXTREMITIES:  No edema, clubbing, or cyanosis.  Left hip dressing.  NEUROLOGIC:  Motor and sensory intact.    ASSESSMENT AND PLAN:    1.   Left hip primary osteoarthritis, status post left total hip arthroplasty.  Pain control, neurovascular checks, aspirin for DVT prophylaxis, physical and occupational therapy.  2.   Diabetes mellitus type 1.  In PACU today, her blood sugar is 400.  We will give her 15 units of short-acting insulin and resume her home medication dose, insulin sliding scale, monitor Accu-Cheks.  3.   Essential hypertension.  Continue home medications and monitor.  4.   Chronic kidney disease stage 3.  We will monitor postoperatively.  5.   Seizure disorder.  Continue home medications and monitor.    Dictated By Ovidio Mendoza MD  d: 01/03/2024 12:21:57  t: 01/03/2024 12:53:25  Job [id number]  FB/

## 2024-01-03 NOTE — H&P
History & Physical Examination    Patient Name: Nikia Urrutia  MRN: [id number]  [facility]: [id number]  YOB: 1950    Diagnosis: L hip DJD    Present Illness: Nikia Urrutia is a 73 year old yo female with a history of left hip end stage DJD.  Cleared by their PMD for a left IESHA.  There are no contraindications to proceed with surgery today.  Informed consent has been obtained and all RBA have been discussed.    Medications Prior to Admission   Medication Sig Dispense Refill Last Dose    Brivaracetam (BRIVIACT) 100 MG Oral Tab Take 1 tablet by mouth 2 (two) times daily.       glucagon (GVOKE HYPOPEN 2-PACK) 1 MG/0.2ML Subcutaneous SUBQ injection Inject 0.2 mL (1 mg total) into the skin once as needed for Low blood glucose.       Ibandronate Sodium 150 MG Oral Tab Take 1 tablet (150 mg total) by mouth every 30 (thirty) days.       insulin glargine (LANTUS SOLOSTAR) 100 UNIT/ML Subcutaneous Solution Pen-injector Inject 28 Units into the skin every morning.       Insulin Lispro, 1 Unit Dial, (HUMALOG KWIKPEN) 100 UNIT/ML Subcutaneous Solution Pen-injector Inject 10 Units into the skin 3 (three) times daily before meals.       losartan 25 MG Oral Tab Take 1 tablet (25 mg total) by mouth daily.       minoxidil 2.5 MG Oral Tab Take 1 tablet (2.5 mg total) by mouth daily. Take 1/2 tab daily       Multiple Vitamin (MULTI-VITAMIN) Oral Tab Take 1 tablet by mouth daily.       sertraline 25 MG Oral Tab Take 2 tablets (50 mg total) by mouth daily.       zolpidem 10 MG Oral Tab Take 1 tablet (10 mg total) by mouth nightly as needed.          Current Facility-Administered Medications   Medication Dose Route Frequency    tranexamic acid (Lysteda) tab 1,300 mg  1,300 mg Oral Once    ceFAZolin (Ancef) 2 g in 20mL IV syringe premix  2 g Intravenous Once    lactated ringers infusion   Intravenous Continuous    acetaminophen (Tylenol Extra Strength) tab 1,000 mg  1,000 mg Oral Once       Allergies: Penicillins and  Sulfa antibiotics    Past Medical History:   Diagnosis Date    Back problem     Cancer (HCC)     right breast    Cancer (HCC)     basal skin on face    Depression     Exposure to medical diagnostic radiation     Hearing impairment     fabricio hearing aides    High blood pressure     Hx of motion sickness     Osteoarthritis     Personal history of antineoplastic chemotherapy     Seizure disorder (HCC)     Type 1 diabetes mellitus (HCC)     Visual impairment     contacts     Past Surgical History:   Procedure Laterality Date          x2    CHOLECYSTECTOMY      FRACTURE SURGERY Left     left hand and middle finger    LUMPECTOMY RIGHT      SPINE SURGERY PROCEDURE UNLISTED Right     S1 fusion    SPINE SURGERY PROCEDURE UNLISTED      L4- S1 fusion     Family History   Problem Relation Age of Onset    Cancer Mother         ovarian and breast     Social History     Tobacco Use    Smoking status: Former     Types: Cigarettes     Quit date:      Years since quittin.0    Smokeless tobacco: Never   Substance Use Topics    Alcohol use: Yes     Comment: social-  5 drinks per week       SYSTEM Check if Review is Normal Check if Physical Exam is Normal If not normal, please explain:   HEENT [ x ] [ ]    NECK & BACK [ x ] [ ]    HEART [ x ] [ ]    LUNGS [ x ] [ ]    ABDOMEN [ x ] [ ]    UROGENITAL [ x ] [ ]    EXTREMITIES [ ] [ x ] Pain with motion of left hip, NVID, skin intact, no CT   OTHER        [ x ] I have discussed the risks and benefits and alternatives with the patient/family.  They understand and agree to proceed with plan of care.  [ x ] I have reviewed the History and Physical done within the last 30 days.  Any changes noted above.    OVIDIO DONATO MD  1/3/2024  7:25 AM

## 2024-01-04 LAB
DEPRECATED RDW RBC AUTO: 47.9 FL (ref 35.1–46.3)
ERYTHROCYTE [DISTWIDTH] IN BLOOD BY AUTOMATED COUNT: 13.8 % (ref 11–15)
GLUCOSE BLDC GLUCOMTR-MCNC: 148 MG/DL (ref 70–99)
GLUCOSE BLDC GLUCOMTR-MCNC: 174 MG/DL (ref 70–99)
GLUCOSE BLDC GLUCOMTR-MCNC: 292 MG/DL (ref 70–99)
GLUCOSE BLDC GLUCOMTR-MCNC: 88 MG/DL (ref 70–99)
HCT VFR BLD AUTO: 24 %
HGB BLD-MCNC: 8.1 G/DL
MCH RBC QN AUTO: 32 PG (ref 26–34)
MCHC RBC AUTO-ENTMCNC: 33.8 G/DL (ref 31–37)
MCV RBC AUTO: 94.9 FL
PLATELET # BLD AUTO: 118 10(3)UL (ref 150–450)
RBC # BLD AUTO: 2.53 X10(6)UL
WBC # BLD AUTO: 3.5 X10(3) UL (ref 4–11)

## 2024-01-04 PROCEDURE — 99214 OFFICE O/P EST MOD 30 MIN: CPT | Performed by: HOSPITALIST

## 2024-01-04 RX ORDER — TRAMADOL HYDROCHLORIDE 50 MG/1
50 TABLET ORAL EVERY 6 HOURS PRN
Status: DISCONTINUED | OUTPATIENT
Start: 2024-01-04 | End: 2024-01-05

## 2024-01-04 RX ORDER — SODIUM CHLORIDE 9 MG/ML
INJECTION, SOLUTION INTRAVENOUS CONTINUOUS
Status: DISCONTINUED | OUTPATIENT
Start: 2024-01-04 | End: 2024-01-05

## 2024-01-04 NOTE — PHYSICAL THERAPY NOTE
Attempted to see pt BID today, per RN, pt still nauseated and vomiting. Will follow up tomorrow as appropriate.

## 2024-01-04 NOTE — PHYSICAL THERAPY NOTE
PHYSICAL THERAPY TREATMENT NOTE - INPATIENT     Room Number: Room 3/Room 3-A       Presenting Problem: s/p L IESHA on 1/3    Problem List  Principal Problem:    Primary osteoarthritis of left hip  Active Problems:    Essential hypertension    Diabetes mellitus type 1 ([facility])    CKD (chronic kidney disease) stage 3, GFR 30-59 ml/min ([facility])    Seizure disorder ([facility])      PHYSICAL THERAPY ASSESSMENT   Chart reviewed. CONNOR Gan approved participation in physical therapy. PPE worn by therapist: mask, gloves, and goggles. Patient was not wearing a mask during session. Patient presented in bedside chair with 3/10 pain.  Education provided on Weight bearing status, Physical therapy plan of care, and physiological benefits of out of bed mobility.  Pt agreeable to therapy, gait belt donned for mobility. Pt ed provided on importance of OOB mobility once home, use of ice, use of RW, HEP therex, and car transfers, all with good pt understanding.   Patient continues to function below baseline with bed mobility, transfers, and gait. Participation limited by episode of emesis this date. Contributing factors to remaining limitations include decreased functional strength and pain. Patient continues to benefit from continued skilled therapy services at discharge to promote functional independence and safety at home with additional support and home-health therapy.     Patient demonstrated good  progress this session, goals remain in progress. Patient with good carryover during session. Will continue to follow patient for duration of hospital stay per plan of care, however pt demos good safety with all functional tasks including stairs and is safe to discharge home once medically cleared. Anticipated therapy needs remain appropriate based on the patient's performance, personal factors, and remaining functional impairments. Final discharge placement decision is directed by the inter-disciplinary team.      Bed mobility:  NT  Transfers:  Contact guard assist  Gait Assistance: Contact guard assist  Distance (ft): 125 ft  Assistive Device: Rolling walker  Pattern: Shuffle;L Decreased stance time (slow pace, step to, distance limited by episode of emesis)  Stair Negotiation: 4 with 2 rails, CGA, step to    Patient was left in bedside chair at end of session with all needs in reach.     The patient's Approx Degree of Impairment: 46.58% has been calculated based on documentation in the [facility] '6 clicks' Inpatient Basic Mobility Short Form.  Research supports that patients with this level of impairment may benefit from Home with home health PT.     RN aware of patient status post session.    DISCHARGE RECOMMENDATIONS  PT Discharge Recommendations: 24 hour care/supervision;Home with home health PT     PLAN  PT Treatment Plan: Bed mobility;Body mechanics;Endurance;Energy conservation;Patient education;Gait training;Strengthening;Stair training;Transfer training;Balance training    SUBJECTIVE  I think I'm going to throw up    OBJECTIVE  Precautions: None (per MD order: \"NO hip precautions (patient is part of a research study)\")    WEIGHT BEARING RESTRICTION  Weight Bearing Restriction: L lower extremity           L Lower Extremity: Weight Bearing as Tolerated    PAIN ASSESSMENT   Rating: 3  Location: L hip  Management Techniques: Body mechanics;Activity promotion    BALANCE                                                                                                                       Static Sitting: Good  Dynamic Sitting: Fair +           Static Standing: Fair  Dynamic Standing: Fair -    ACTIVITY TOLERANCE           BP: 115/55  BP Location: Left arm  BP Method: Automatic  Patient Position: Sitting        AM-PAC '6-Clicks' INPATIENT SHORT FORM - BASIC MOBILITY  How much difficulty does the patient currently have...  Patient Difficulty: Turning over in bed (including adjusting bedclothes, sheets and blankets)?: A Little   Patient Difficulty: Sitting down  on and standing up from a chair with arms (e.g., wheelchair, bedside commode, etc.): A Little   Patient Difficulty: Moving from lying on back to sitting on the side of the bed?: A Little   How much help from another person does the patient currently need...   Help from Another: Moving to and from a bed to a chair (including a wheelchair)?: A Little   Help from Another: Need to walk in hospital room?: A Little   Help from Another: Climbing 3-5 steps with a railing?: A Little     AM-PAC Score:  Raw Score: 18   Approx Degree of Impairment: 46.58%   Standardized Score (AM-PAC Scale): 43.63   CMS Modifier (G-Code): CK      Additional information: Pt needs RW for DC, RN aware    THERAPEUTIC EXERCISES  Lower Extremity Ankle pumps  Hip AB/AD  LAQ  Quad sets     Position Sitting       Patient End of Session: Up in chair;Needs met;Call light within reach;RN aware of session/findings    CURRENT GOALS   Goals to be met by: 1/10/24  Patient Goal Patient's self-stated goal is: go home   Goal #1 Patient is able to demonstrate supine - sit EOB @ level: modified independent   Goal #1   Current Status  NT   Goal #2 Patient is able to demonstrate transfers Sit to/from Stand at assistance level: modified independent      Goal #2  Current Status  CGA with RW   Goal #3 Patient is able to ambulate 150 feet with assistive device at assistance level: supervision   Goal #3   Current Status  125 ft with RW, CGA   Goal #4 Patient will negotiate 4 stairs/one curb w/ assistive device and supervision   Goal #4   Current Status  4 with 1 rail, CGA   Goal #5 Patient verbalizes and/or demonstrates all precautions and safety concerns independently   Goal #5   Current Status  in progress   Goal #6 Patient independently performs home exercise program for ROM/strengthening per the instructions provided in preparation for discharge.   Goal #6  Current Status  in progress     Gait Training: 15 minutes  Therapeutic Activity: 15 minutes    Lotus Clark  [facility]  [phone]

## 2024-01-04 NOTE — OCCUPATIONAL THERAPY NOTE
OCCUPATIONAL THERAPY EVALUATION - INPATIENT     Room Number: Room 3/Room 3-A  Evaluation Date: 1/4/2024  Type of Evaluation: Initial    Presenting Problem: S/P L IESHA; Posterior approach, without precautions    Co-Morbidities: Nausea, CKD, DM 1, HTN       Physician Order: IP Consult to Occupational Therapy  Reason for Therapy: ADL/IADL Dysfunction and Discharge Planning    OCCUPATIONAL THERAPY ASSESSMENT   Patient is a 73 year old female admitted 1/3/2024 for L IESHA, posterior approach.  In this OT evaluation patient presents with the following impairments: decreased activity tolerance, increased pain, nausea.  These deficits manifest functionally while performing ADL's, IADL's, functional mobility, functional transfers, negotiating stairs and bed mobility .   The patient is below baseline and would benefit from skilled inpatient OT to address the above deficits, maximizing patient's ability to return to prior level of function.    The patient's Approx Degree of Impairment: 25.8% has been calculated based on documentation in the [facility] '6 clicks' Inpatient Daily Activity Short Form.  Research supports that patients with this level of impairment may benefit from discharge home without service. Pt would benefit from continued skilled PT per ortho protocol.    DISCHARGE RECOMMENDATIONS  OT Discharge Recommendations: Home  OT Device Recommendations: Reacher; Sock aid; Long-handled shoehorn; Long-handled sponge    PLAN  OT Treatment Plan: ADL training;Patient/Family education;Patient/Family training;Functional transfer training;Equipment eval/education       OCCUPATIONAL THERAPY MEDICAL/SOCIAL HISTORY   Problem List   Principal Problem:    Primary osteoarthritis of left hip  Active Problems:    Essential hypertension    Diabetes mellitus type 1 (HCC)    CKD (chronic kidney disease) stage 3, GFR 30-59 ml/min (HCC)    Seizure disorder (HCC)    HOME SITUATION  Type of Home: Condo (4  exterior stairs to enter)  Home Layout: One  level  Lives With: Spouse  Toilet and Equipment: Standard height toilet  Shower/Tub and Equipment: Tub-shower combo; Grab bar  Other Equipment: None  Occupation/Status: Retired  Hand Dominance: Right  Drives: Yes  Patient Regularly Uses: Glasses    Stairs in Home: N/A  Assistive Device(s) Used: N/A     Prior Level of [place]: Pt was independent with ADL's, IADL's, functional mobility, functional transfers, negotiating stairs and driving.     SUBJECTIVE  \"I usually wear contacts!\"    OCCUPATIONAL THERAPY EXAMINATION   OBJECTIVE  Precautions: None. Pt is part of a research for no precautions post posterior hip.  Fall Risk: Standard fall risk    WEIGHT BEARING RESTRICTION  L Lower Extremity: Weight Bearing as Tolerated    PAIN ASSESSMENT  Rating: 3  Location: L hip  Management Techniques: Activity promotion; Body mechanics; Breathing techniques; Relaxation; Repositioning      O2 SATURATIONS  Oxygen Therapy  SPO2% on Room Air at Rest: 99    COGNITION  Overall Cognitive Status:  WFL - within functional limits    RANGE OF MOTION   Upper extremity ROM is within functional limits    STRENGTH ASSESSMENT  Upper extremity strength is within functional limits     ACTIVITIES OF DAILY LIVING ASSESSMENT  AM-PAC ‘6-Clicks’ Inpatient Daily Activity Short Form  How much help from another person does the patient currently need…  -   Putting on and taking off regular lower body clothing?: A Little  -   Bathing (including washing, rinsing, drying)?: A Little  -   Toileting, which includes using toilet, bedpan or urinal? : None  -   Putting on and taking off regular upper body clothing?: None  -   Taking care of personal grooming such as brushing teeth?: None  -   Eating meals?: None    AM-PAC Score:  Score: 22  Approx Degree of Impairment: 25.8%  Standardized Score (AM-PAC Scale): 47.1  CMS Modifier (G-Code): CJ    FUNCTIONAL TRANSFER ASSESSMENT  Sit to Stand: Chair  Chair: Modified Independent    BED MOBILITY  Rolling: Not Tested  (Pt was up in the chair)    BALANCE ASSESSMENT  Static Sitting: Independent  Static Standing: Modified Independent    FUNCTIONAL ADL ASSESSMENT     THERAPEUTIC EXERCISE     Skilled Therapy Provided: ADL training, functional transfers, bedroom mobility, pt and spouse education and training    EDUCATION PROVIDED  Patient : Role of Occupational Therapy; Plan of Care; Discharge Recommendations; Functional Transfer Techniques; DME Recommendations; Adaptive Equipment Recommendations; Fall Prevention; Energy Conservation; Compensatory ADL Techniques  Patient's Response to Education: Verbalized Understanding; Returned Demonstration  Family/Caregiver: Role of Occupational Therapy; Plan of Care; Discharge Recommendations; Adaptive Equipment Recommendations; DME Recommendations; Compensatory ADL Techniques  Family/Caregiver's Response to Education: Verbalized Understanding    Patient End of Session: Up in chair;Needs met;Call light within reach;RN aware of session/findings;All patient questions and concerns addressed;Family present    OT Goals  Patient self-stated goal is: Return home     Patient will complete LE dressing with Min A  Comment:     Patient will complete toilet transfer with MI  Comment:     Patient will complete self care task at sink level with MI   Comment:           Goals  on: 24  Frequency: 1 session; DC OT    Patient Evaluation Complexity Level:   Occupational Profile/Medical History LOW - Brief history including review of medical or therapy records    Specific performance deficits impacting engagement in ADL/IADL LOW  1 - 3 performance deficits    Client Assessment/Performance Deficits LOW - No comorbidities nor modifications of tasks    Clinical Decision Making LOW - Analysis of occupational profile, problem-focused assessments, limited treatment options    Overall Complexity LOW       Self-Care Home Management: 18 minutes      Zuly Ashley OTR/L  Occupational Therapy  [facility]

## 2024-01-04 NOTE — CM/SW NOTE
SW followed up on DC planning.     Received email from [facility] stating they are pre ortho with this pt from Dr. No's office    Referral sent to [facility]    Orders/F2F entered    [address], [place] [address]  Phone: [phone]  Fax: [phone]    PLAN: home with [facility]    BRYN Su, MSW ext. [phone]

## 2024-01-04 NOTE — PROGRESS NOTES
[facility]    Progress Note    Nikia Urrutia Patient Status:  Outpatient in a Bed    3/17/1950 MRN [id number]   Location [facility] Attending Rich Leung MD   Hosp Day # 0 PCP MUKUL FALK MD     Chief Complaint:     Primary osteoarthritis of Left hip    Subjective:   Subjective:    Patient seen and examined  She has been nauseous  Pain is better controlled  Afebrile normotensive.    Objective:   Blood pressure 115/55, pulse 93, temperature 98.4 °F (36.9 °C), temperature source Oral, resp. rate 18, height 5' 5\" (1.651 m), weight 140 lb (63.5 kg), SpO2 96%.  Physical Exam    GENERAL:  Alert.  Oriented to time, place, and person.  Moderate distress.  HEENT:  Atraumatic.  Oropharynx clear, moist mucous membranes.  Normal hard and soft palate.  Eyes:  Anicteric sclerae.  NECK:  Supple.  No lymphadenopathy.  Trachea midline.  Full range of motion.  LUNGS:  Clear to auscultation bilaterally.  Normal respiratory effort.  HEART:  Regular rate and rhythm.  S1, S2 auscultated.  No murmur.  ABDOMEN:  Soft, nondistended, no tenderness.  Positive bowel sounds.  EXTREMITIES:  No edema, clubbing, or cyanosis.  Left hip dressing.  NEUROLOGIC:  Motor and sensory intact.    Results:   Lab Results   Component Value Date    WBC 3.5 (L) 2024    HGB 8.1 (L) 2024    HCT 24.0 (L) 2024    .0 (L) 2024    CREATSERUM 1.16 (H) 2024    BUN 22 2024     2024    K 4.1 2024     2024    CO2 24.0 2024     (H) 2024    CA 8.5 (L) 2024       XR PELVIS (1 VIEW) (CPT=72170)    Result Date: 1/3/2024  CONCLUSION:  1. Total left hip arthroplasty.    Dictated by (CST): Eris Russell MD on 2024 at 1:28 PM     Finalized by (CST): Eris Russell MD on 2024 at 1:33 PM               Assessment & Plan:      Left hip primary osteoarthritis, status post left total hip arthroplasty.  Pain control, neurovascular checks,  aspirin for DVT prophylaxis, physical and occupational therapy.    Diabetes mellitus type 1.  In PACU today, her blood sugar is 400.  We will give her 15 units of short-acting insulin and resume her home medication dose, insulin sliding scale, monitor Accu-Cheks.    Essential hypertension.  Continue home medications and monitor.    Chronic kidney disease stage 3.  We will monitor postoperatively.    Seizure disorder.  Continue home medications and monitor.    Global A/P  -hgb 8.1  -Reviewed previous consultant notes  -Reviewed CBC, BMP, Mag, and Phos  -Reviewed tests ordered  -Repeat labs in am  -dc home once clears PT  -MDM: High, severe exacerbation of chronic illness posing a threat to life. IV medications requiring close inpatient monitoring.         Rich Leung MD

## 2024-01-04 NOTE — PROGRESS NOTES
[facility]    Progress Note    Nikia Urrutia Patient Status:  Outpatient in a Bed    3/17/1950 MRN [id number]   Location [facility] Attending Williams No MD   Hosp Day # 0  PCP MUKUL FALK MD       Subjective:   Nikia Urrutia is a(n) 73 year old female who is POD #1 status post a  left total hip arthroplasty. Doing well this morning in bed.    Objective:   Blood pressure 123/59, pulse 93, temperature 97.6 °F (36.4 °C), temperature source Oral, resp. rate 18, height 5' 5\" (1.651 m), weight 140 lb (63.5 kg), SpO2 96%.      left Lower Extremity    Hip Incision:   Intact    Clean    Lower Extremity Motor:   Quadriceps: 5/5   Extensor hallucis: 5/5   Dorsiflexion: 5/5   Plantarflexion: 5/5    Lower Extremity Sensory:   Tibial Nerve: intact to light touch   Superficial Peroneal Nerve: intact to light touch   Deep Peroneal Nerve: intact to light touch    Pulses:   Posterior Tibial: Present   Dorsalis Pedis: Present   < 2 sec cap refill to toes noted    Calf Tenderness   No CT BLE    Abnormal Exam Findings: None    Assessment and Plan:     Primary osteoarthritis of left hip      Essential hypertension      Diabetes mellitus type 1 (HCC)      CKD (chronic kidney disease) stage 3, GFR 30-59 ml/min (HCC)      Seizure disorder (HCC)        Post operative day 1 status post left total hip arthroplasty  1. PT/OT--Weight bearing to tolerance  2. Continue physical therapy  3. Deep venous thrombosis prophylaxis: Chemoprophylaxis/DARIEL hose/SCDs  4. Pain management: Oral meds  5. D/C plan: home with home health  6. DC home if clears PT  7. H/H-stable for postop    Results:     Lab Results   Component Value Date    WBC 3.5 (L) 2024    HGB 8.1 (L) 2024    HCT 24.0 (L) 2024    .0 (L) 2024    CREATSERUM 1.16 (H) 2024    BUN 22 2024     2024    K 4.1 2024     2024    CO2 24.0 2024     (H) 2024    CA 8.5  (L) 01/03/2024       XR PELVIS (1 VIEW) (CPT=72170)    Result Date: 1/3/2024  CONCLUSION:  1. Total left hip arthroplasty.    Dictated by (CST): Eris Russell MD on 1/03/2024 at 1:28 PM     Finalized by (CST): Eris Russell MD on 1/03/2024 at 1:33 PM                 OVIDIO DONATO MD  [facility]  [phone] (c)  [phone] (o)  1/4/2024

## 2024-01-04 NOTE — OCCUPATIONAL THERAPY NOTE
01/04/24 0916   VISIT TYPE   OT Inpatient Visit Type  Attempted Evaluation  Pt was finishing session with PTA and reported vomiting. PTA and pt requested to defer session to a later time.   OT Follow Up   Charge Missed visit          Zuly Ashley OTR/L  Occupational Therapy  [facility]

## 2024-01-05 VITALS
BODY MASS INDEX: 23.32 KG/M2 | HEART RATE: 98 BPM | TEMPERATURE: 100 F | WEIGHT: 140 LBS | OXYGEN SATURATION: 95 % | RESPIRATION RATE: 20 BRPM | SYSTOLIC BLOOD PRESSURE: 121 MMHG | DIASTOLIC BLOOD PRESSURE: 61 MMHG | HEIGHT: 65 IN

## 2024-01-05 LAB
ANION GAP SERPL CALC-SCNC: 10 MMOL/L (ref 0–18)
BASOPHILS # BLD AUTO: 0.06 X10(3) UL (ref 0–0.2)
BASOPHILS NFR BLD AUTO: 1 %
BUN BLD-MCNC: 16 MG/DL (ref 9–23)
BUN/CREAT SERPL: 14.8 (ref 10–20)
CALCIUM BLD-MCNC: 8.7 MG/DL (ref 8.7–10.4)
CHLORIDE SERPL-SCNC: 107 MMOL/L (ref 98–112)
CO2 SERPL-SCNC: 19 MMOL/L (ref 21–32)
CREAT BLD-MCNC: 1.08 MG/DL
DEPRECATED RDW RBC AUTO: 49.1 FL (ref 35.1–46.3)
DEPRECATED RDW RBC AUTO: 49.1 FL (ref 35.1–46.3)
EGFRCR SERPLBLD CKD-EPI 2021: 55 ML/MIN/1.73M2 (ref 60–?)
EOSINOPHIL # BLD AUTO: 0.09 X10(3) UL (ref 0–0.7)
EOSINOPHIL NFR BLD AUTO: 1.5 %
ERYTHROCYTE [DISTWIDTH] IN BLOOD BY AUTOMATED COUNT: 13.8 % (ref 11–15)
ERYTHROCYTE [DISTWIDTH] IN BLOOD BY AUTOMATED COUNT: 13.8 % (ref 11–15)
GLUCOSE BLD-MCNC: 250 MG/DL (ref 70–99)
GLUCOSE BLDC GLUCOMTR-MCNC: 313 MG/DL (ref 70–99)
HCT VFR BLD AUTO: 24.2 %
HCT VFR BLD AUTO: 24.2 %
HGB BLD-MCNC: 8.2 G/DL
HGB BLD-MCNC: 8.2 G/DL
IMM GRANULOCYTES # BLD AUTO: 0.01 X10(3) UL (ref 0–1)
IMM GRANULOCYTES NFR BLD: 0.2 %
LYMPHOCYTES # BLD AUTO: 0.82 X10(3) UL (ref 1–4)
LYMPHOCYTES NFR BLD AUTO: 13.9 %
MCH RBC QN AUTO: 32.7 PG (ref 26–34)
MCH RBC QN AUTO: 32.7 PG (ref 26–34)
MCHC RBC AUTO-ENTMCNC: 33.9 G/DL (ref 31–37)
MCHC RBC AUTO-ENTMCNC: 33.9 G/DL (ref 31–37)
MCV RBC AUTO: 96.4 FL
MCV RBC AUTO: 96.4 FL
MONOCYTES # BLD AUTO: 0.58 X10(3) UL (ref 0.1–1)
MONOCYTES NFR BLD AUTO: 9.9 %
NEUTROPHILS # BLD AUTO: 4.32 X10 (3) UL (ref 1.5–7.7)
NEUTROPHILS # BLD AUTO: 4.32 X10(3) UL (ref 1.5–7.7)
NEUTROPHILS NFR BLD AUTO: 73.5 %
OSMOLALITY SERPL CALC.SUM OF ELEC: 292 MOSM/KG (ref 275–295)
PLATELET # BLD AUTO: 174 10(3)UL (ref 150–450)
PLATELET # BLD AUTO: 174 10(3)UL (ref 150–450)
POTASSIUM SERPL-SCNC: 4.1 MMOL/L (ref 3.5–5.1)
RBC # BLD AUTO: 2.51 X10(6)UL
RBC # BLD AUTO: 2.51 X10(6)UL
SODIUM SERPL-SCNC: 136 MMOL/L (ref 136–145)
WBC # BLD AUTO: 5.9 X10(3) UL (ref 4–11)
WBC # BLD AUTO: 5.9 X10(3) UL (ref 4–11)

## 2024-01-05 PROCEDURE — 99214 OFFICE O/P EST MOD 30 MIN: CPT | Performed by: HOSPITALIST

## 2024-01-05 RX ORDER — ONDANSETRON 4 MG/1
4 TABLET, ORALLY DISINTEGRATING ORAL EVERY 8 HOURS PRN
Qty: 21 TABLET | Refills: 0 | Status: SHIPPED | OUTPATIENT
Start: 2024-01-05

## 2024-01-05 RX ORDER — ONDANSETRON 4 MG/1
4 TABLET, ORALLY DISINTEGRATING ORAL EVERY 8 HOURS PRN
Qty: 21 TABLET | Refills: 0 | Status: SHIPPED | OUTPATIENT
Start: 2024-01-05 | End: 2024-01-05

## 2024-01-05 NOTE — PROGRESS NOTES
[facility]    Progress Note    Nikia Urrutia Patient Status:  Outpatient in a Bed    3/17/1950 MRN [id number]   Location [facility] Attending Rich Leung MD   Hosp Day # 0  PCP MUKUL FALK MD       Subjective:   Nikia Urrutia is a(n) 73 year old female who is POD #2 status post a  left total hip arthroplasty. Doing better in chair today, would like to go home.    Objective:   Blood pressure 121/61, pulse 98, temperature 99.5 °F (37.5 °C), temperature source Oral, resp. rate 20, height 5' 5\" (1.651 m), weight 140 lb (63.5 kg), SpO2 95%.      left Lower Extremity    Hip Incision:   Intact    Clean    Lower Extremity Motor:   Quadriceps: 5/5   Extensor hallucis: 5/5   Dorsiflexion: 5/5   Plantarflexion: 5/5    Lower Extremity Sensory:   Tibial Nerve: intact to light touch   Superficial Peroneal Nerve: intact to light touch   Deep Peroneal Nerve: intact to light touch    Pulses:   Posterior Tibial: Present   Dorsalis Pedis: Present   < 2 sec cap refill to toes noted    Calf Tenderness   No CT BLE    Abnormal Exam Findings: None    Assessment and Plan:     Primary osteoarthritis of left hip      Essential hypertension      Diabetes mellitus type 1 ([facility])      CKD (chronic kidney disease) stage 3, GFR 30-59 ml/min ([facility])      Seizure disorder (HCC)        Post operative day 2 status post left total hip arthroplasty  1. PT/OT--Weight bearing to tolerance  2. Continue physical therapy  3. Deep venous thrombosis prophylaxis: Chemoprophylaxis/DARIEL hose/SCDs  4. Pain management: Oral meds  5. D/C plan: home with home health  6. DC home later today if clears PT    Results:     Lab Results   Component Value Date    WBC 5.9 2024    WBC 5.9 2024    HGB 8.2 (L) 2024    HGB 8.2 (L) 2024    HCT 24.2 (L) 2024    HCT 24.2 (L) 2024    .0 2024    .0 2024    CREATSERUM 1.08 (H) 2024    BUN 16 2024     2024     K 4.1 01/05/2024     01/05/2024    CO2 19.0 (L) 01/05/2024     (H) 01/05/2024    CA 8.7 01/05/2024       XR PELVIS (1 VIEW) (CPT=72170)    Result Date: 1/3/2024  CONCLUSION:  1. Total left hip arthroplasty.    Dictated by (CST): Eris Russell MD on 1/03/2024 at 1:28 PM     Finalized by (CST): Eris Russell MD on 1/03/2024 at 1:33 PM                 OVIDIO DONATO MD  [facility]  [phone] (c)  [phone] (o)  1/5/2024

## 2024-01-05 NOTE — PHYSICAL THERAPY NOTE
PHYSICAL THERAPY TREATMENT NOTE - INPATIENT     Room Number: Room 3/Room 3-A       Presenting Problem: s/p L IESHA on 1/3    Problem List  Principal Problem:    Primary osteoarthritis of left hip  Active Problems:    Essential hypertension    Diabetes mellitus type 1 (HCC)    CKD (chronic kidney disease) stage 3, GFR 30-59 ml/min ([facility])    Seizure disorder ([facility])      PHYSICAL THERAPY ASSESSMENT   Chart reviewed. CONNOR Gan approved participation in physical therapy. PPE worn by therapist: mask and gloves. Patient was not wearing a mask during session. Patient presented in  bathroom  with unrated pain. Patient with good  progress towards goals during this session. Education provided on Physical therapy plan of care and physiological benefits of out of bed mobility. Patient with good carryover. Pt agreeable to therapy, gait belt donned for mobility. Pt ed provided on importance of OOB mobility once home, use of ice, use of RW, HEP therex, and car transfers, all with good pt understanding. Pt safe with functional mobility and demos adequate strength and balance to discharge from PT standpoint.     Bed mobility:  NT  Transfers:  SBA for STS transfers with RW  Gait Assistance:  (SBA)  Distance (ft): 125 ft  Assistive Device: Rolling walker  Pattern: Shuffle;L Decreased stance time (slow thi, steady with no LOB)          Patient was left in bedside chair at end of session with all needs in reach.     The patient's Approx Degree of Impairment: 46.58% has been calculated based on documentation in the [facility] '6 clicks' Inpatient Basic Mobility Short Form.  Research supports that patients with this level of impairment may benefit from Home with home health PT.     RN aware of patient status post session.    DISCHARGE RECOMMENDATIONS  PT Discharge Recommendations: 24 hour care/supervision;Home with home health PT     PLAN  PT Treatment Plan: Bed mobility;Body mechanics;Endurance;Energy conservation;Patient education;Gait  training;Strengthening;Stair training;Transfer training;Balance training    SUBJECTIVE  I'm much better today    OBJECTIVE  Precautions: None    WEIGHT BEARING RESTRICTION  Weight Bearing Restriction: L lower extremity           L Lower Extremity: Weight Bearing as Tolerated    PAIN ASSESSMENT   Rating:  (did not rate)  Location: L hip  Management Techniques: Activity promotion;Body mechanics    BALANCE                                                                                                                       Static Sitting: Good  Dynamic Sitting: Fair +           Static Standing: Fair  Dynamic Standing: Fair -        AM-PAC '6-Clicks' INPATIENT SHORT FORM - BASIC MOBILITY  How much difficulty does the patient currently have...  Patient Difficulty: Turning over in bed (including adjusting bedclothes, sheets and blankets)?: A Little   Patient Difficulty: Sitting down on and standing up from a chair with arms (e.g., wheelchair, bedside commode, etc.): A Little   Patient Difficulty: Moving from lying on back to sitting on the side of the bed?: A Little   How much help from another person does the patient currently need...   Help from Another: Moving to and from a bed to a chair (including a wheelchair)?: A Little   Help from Another: Need to walk in hospital room?: A Little   Help from Another: Climbing 3-5 steps with a railing?: A Little     AM-PAC Score:  Raw Score: 18   Approx Degree of Impairment: 46.58%   Standardized Score (AM-PAC Scale): 43.63   CMS Modifier (G-Code): CK          THERAPEUTIC EXERCISES  Lower Extremity Ankle pumps  Hip AB/AD  Heel slides  LAQ  Quad sets     Position Sitting       Patient End of Session: Up in chair;Needs met;Call light within reach;RN aware of session/findings    CURRENT GOALS   Goals to be met by: 1/10/24  Patient Goal Patient's self-stated goal is: go home   Goal #1 Patient is able to demonstrate supine - sit EOB @ level: modified independent   Goal #1   Current  Status  NT   Goal #2 Patient is able to demonstrate transfers Sit to/from Stand at assistance level: modified independent      Goal #2  Current Status  SBA with RW   Goal #3 Patient is able to ambulate 150 feet with assistive device at assistance level: supervision   Goal #3   Current Status  125 ft with RW, SGA   Goal #4 Patient will negotiate 4 stairs/one curb w/ assistive device and supervision   Goal #4   Current Status  NT   Goal #5 Patient verbalizes and/or demonstrates all precautions and safety concerns independently   Goal #5   Current Status  in progress   Goal #6 Patient independently performs home exercise program for ROM/strengthening per the instructions provided in preparation for discharge.   Goal #6  Current Status  in progress     Gait Training: 15 minutes  Therapeutic Activity: 8 minutes    Lotus Clark [facility]  [phone]

## 2024-01-05 NOTE — DISCHARGE SUMMARY
[facility]     Discharge Summary    Nikia Urrutia Patient Status:  Outpatient in a Bed    3/17/1950 MRN [id number]   Location [facility] Attending Rich Leung MD   Hosp Day # 0 PCP MUKUL FALK MD     Date of Admission: 1/3/2024    Date of Discharge: 2024    Admitting Diagnosis: Left hip degenerative joint disease    Discharge Diagnosis:   Patient Active Problem List   Diagnosis    Primary osteoarthritis of left hip    Essential hypertension    Diabetes mellitus type 1 (HCC)    CKD (chronic kidney disease) stage 3, GFR 30-59 ml/min ([facility])    Seizure disorder ([facility])       Reason for Admission:     Primary osteoarthritis of Left Hip    Physical Exam:     GENERAL:  Alert.  Oriented to time, place, and person.  Moderate distress.  HEENT:  Atraumatic.  Oropharynx clear, moist mucous membranes.  Normal hard and soft palate.  Eyes:  Anicteric sclerae.  NECK:  Supple.  No lymphadenopathy.  Trachea midline.  Full range of motion.  LUNGS:  Clear to auscultation bilaterally.  Normal respiratory effort.  HEART:  Regular rate and rhythm.  S1, S2 auscultated.  No murmur.  ABDOMEN:  Soft, nondistended, no tenderness.  Positive bowel sounds.  EXTREMITIES:  No edema, clubbing, or cyanosis.  Left hip dressing.  NEUROLOGIC:  Motor and sensory intact.    Hospital Course:     Left hip primary osteoarthritis, status post left total hip arthroplasty.  Pain control, neurovascular checks, aspirin for DVT prophylaxis, physical and occupational therapy.     Diabetes mellitus type 1.  In PACU today, her blood sugar is 400.  We will give her 15 units of short-acting insulin and resume her home medication dose, insulin sliding scale, monitor Accu-Cheks.     Essential hypertension.  Continue home medications and monitor.     Chronic kidney disease stage 3.  We will monitor postoperatively.     Seizure disorder.  Continue home medications and monitor.     Global A/P  -hgb 8.1  -Reviewed previous  consultant notes  -Reviewed CBC, BMP, Mag, and Phos  -Reviewed tests ordered  -Repeat labs in am  -dc home once clears PT  -MDM: High, severe exacerbation of chronic illness posing a threat to life. IV medications requiring close inpatient monitoring.       History of Present Illness:   Per admitting attending.  Nikia Urrutia is a 73 year old yo female with a history of left hip end stage DJD.  Cleared by their PMD for a left IESHA.  There are no contraindications to proceed with surgery today.  Informed consent has been obtained and all RBA have been discussed.       Disposition: Final discharge disposition not confirmed    Discharge Condition: Stable    Discharge Medications:   Current Discharge Medication List        START taking these medications    Details   ondansetron 4 MG Oral Tablet Dispersible Take 1 tablet (4 mg total) by mouth every 8 (eight) hours as needed for Nausea.  Qty: 21 tablet, Refills: 0           CONTINUE these medications which have NOT CHANGED    Details   Brivaracetam (BRIVIACT) 100 MG Oral Tab Take 1 tablet by mouth 2 (two) times daily.      Ibandronate Sodium 150 MG Oral Tab Take 1 tablet (150 mg total) by mouth every 30 (thirty) days.      insulin glargine (LANTUS SOLOSTAR) 100 UNIT/ML Subcutaneous Solution Pen-injector Inject 28 Units into the skin every morning.      Insulin Lispro, 1 Unit Dial, (HUMALOG KWIKPEN) 100 UNIT/ML Subcutaneous Solution Pen-injector Inject 10 Units into the skin 3 (three) times daily before meals.      losartan 25 MG Oral Tab Take 1 tablet (25 mg total) by mouth daily.      minoxidil 2.5 MG Oral Tab Take 1 tablet (2.5 mg total) by mouth daily. Take 1/2 tab daily      Multiple Vitamin (MULTI-VITAMIN) Oral Tab Take 1 tablet by mouth daily.      sertraline 25 MG Oral Tab Take 2 tablets (50 mg total) by mouth daily.      zolpidem 10 MG Oral Tab Take 1 tablet (10 mg total) by mouth nightly as needed.      glucagon (GVOKE HYPOPEN 2-PACK) 1 MG/0.2ML  Subcutaneous SUBQ injection Inject 0.2 mL (1 mg total) into the skin once as needed for Low blood glucose.             Total dc time > 30 min    Rich Leung MD  1/5/2024  12:10 PM     Hospital Discharge Diagnoses:  Primary osteoarthritis of Left Hip    Lace+ Score: 27  59-90 High Risk  29-58 Medium Risk  0-28   Low Risk.    TCM Follow-Up Recommendation:  LACE > 58: High Risk of readmission after discharge from the hospital.

## (undated) DEVICE — 3M(TM) TEGADERM(TM) TRANSPARENT FILM DRESSING FRAME STYLE 1628: Brand: 3M™ TEGADERM™

## (undated) DEVICE — ADH DRMBND PRINEO SKN CLOS TOP

## (undated) DEVICE — GAMMEX® PI HYBRID SIZE 7, STERILE POWDER-FREE SURGICAL GLOVE, POLYISOPRENE AND NEOPRENE BLEND: Brand: GAMMEX

## (undated) DEVICE — DRAPE SHEET LG

## (undated) DEVICE — SKIN PREP TRAY 4 COMPARTM TRAY: Brand: MEDLINE INDUSTRIES, INC.

## (undated) DEVICE — SHEET,DRAPE,70X100,STERILE: Brand: MEDLINE

## (undated) DEVICE — SPONGE GZ 4XL4IN 100% COT 12 PLY TYP VII WVN

## (undated) DEVICE — DRAPE SRG 70X60IN SPLT U IMPRV

## (undated) DEVICE — GAMMEX® NON-LATEX PI ORTHO SIZE 6.5, STERILE POLYISOPRENE POWDER-FREE SURGICAL GLOVE: Brand: GAMMEX

## (undated) DEVICE — Device: Brand: JELCO

## (undated) DEVICE — SOLUTION PREP 26ML 0.7% POVACRYLEX 74% ISO

## (undated) DEVICE — INTENDED USE FOR SURGICAL MARKING ON INTACT SKIN, ALSO PROVIDES A PERMANENT METHOD OF IDENTIFYING OBJECTS IN THE OPERATING ROOM: Brand: WRITESITE® PLUS MINI PREP RESISTANT MARKER

## (undated) DEVICE — PILLOW ABD M W15XH6XL22IN LEG RASPBERRY FOAM

## (undated) DEVICE — GAMMEX® NON-LATEX PI ORTHO SIZE 8.5, STERILE POLYISOPRENE POWDER-FREE SURGICAL GLOVE: Brand: GAMMEX

## (undated) DEVICE — HOOD: Brand: FLYTE

## (undated) DEVICE — TRAY CATH 16FR F INCLUDE BARDX IC COMPLT CARE

## (undated) DEVICE — SOLUTION IRRIG 1000ML 0.9% NACL USP BTL

## (undated) DEVICE — ENCORE® LATEX MICRO SIZE 6.5, STERILE LATEX POWDER-FREE SURGICAL GLOVE: Brand: ENCORE

## (undated) DEVICE — SUTURE MCRYL SZ 2-0 L36IN ABSRB UD L36MM CT-1

## (undated) DEVICE — Device: Brand: STABLECUT®

## (undated) DEVICE — ENCORE® LATEX MICRO SIZE 8.5, STERILE LATEX POWDER-FREE SURGICAL GLOVE: Brand: ENCORE

## (undated) DEVICE — GAMMEX® PI HYBRID SIZE 8.5, STERILE POWDER-FREE SURGICAL GLOVE, POLYISOPRENE AND NEOPRENE BLEND: Brand: GAMMEX

## (undated) DEVICE — PACK CDS TOTAL HIP

## (undated) DEVICE — PAD N ADH W3XL4IN POLY COT SFT PERF FLM ABSRB

## (undated) DEVICE — BIT DRL L30MM DIA3.2MM DISP FOR G7 2 MOBILITY

## (undated) DEVICE — SOLUTION IRRIG 3000ML 0.9% NACL FLX CONT

## (undated) DEVICE — SUTURE ETHBND EXCEL SZ 2 L30IN NONABSORBABLE MX69G

## (undated) DEVICE — 2T11 #2 PDO 36 X 36: Brand: 2T11 #2 PDO 36 X 36

## (undated) DEVICE — 20 ML SYRINGE LUER-LOCK TIP: Brand: MONOJECT